# Patient Record
Sex: FEMALE | Race: WHITE | NOT HISPANIC OR LATINO | URBAN - METROPOLITAN AREA
[De-identification: names, ages, dates, MRNs, and addresses within clinical notes are randomized per-mention and may not be internally consistent; named-entity substitution may affect disease eponyms.]

---

## 2018-05-15 ENCOUNTER — EMERGENCY (EMERGENCY)
Facility: HOSPITAL | Age: 83
LOS: 1 days | Discharge: HOME | End: 2018-05-15
Attending: EMERGENCY MEDICINE | Admitting: EMERGENCY MEDICINE
Payer: COMMERCIAL

## 2018-05-15 VITALS
DIASTOLIC BLOOD PRESSURE: 76 MMHG | SYSTOLIC BLOOD PRESSURE: 174 MMHG | OXYGEN SATURATION: 100 % | HEART RATE: 68 BPM | RESPIRATION RATE: 18 BRPM

## 2018-05-15 PROCEDURE — 99283 EMERGENCY DEPT VISIT LOW MDM: CPT

## 2018-05-15 NOTE — ED PROVIDER NOTE - PLAN OF CARE
1. Rest, ice, elevate area.  Take acetaminophen (Tylenol) 650mg (2 regular strength tablets) as often as every 6 hours as needed for pain. Never take more than 4000mg of acetaminophen/Tylenol in any 24 hour period.   2. Follow up with PMD within 48-72 hrs.    3. Any worsening pain, swelling, weakness, numbness return to ED.

## 2018-05-15 NOTE — ED PROVIDER NOTE - UPPER EXTREMITY EXAM, RIGHT
normal/No obvious deformity, or swelling of right wrist/dorsum/palmar aspect,  radial and ulnar pulses intact, no TTP to wrist MCP/PIP/DIP joint, no snuffbox tenderness, sensation intact bilaterally, ROM of wrist intact, finger flexion and extension WNL

## 2018-05-15 NOTE — ED PROVIDER NOTE - ATTENDING CONTRIBUTION TO CARE
84 y/o female with the above documented history and HPI who on exam  appears well and comfortable. VSs noted, head NC/AT, EOMsI, neck supple c/ FROM s/ pain, paresthesia or midline c-spine ttp, lungs CTA, chest wall s/ ecchymosis, flail or ttp, cardiac sounds s/ audible m/r/g, abdomen soft, NT/ND s/ ecchymosis, back s/ ecchymosis or midline ttp, extremities s/ asymmetry c/ FROM X 4 s/ hesitation or discomfort including her R shoulder, elbow, wrist and digits, skin s/ ecchymosis c/ more of an abrasion than laceration to her R forearm s/ bony point ttp and neurologically completely intact. There is nothing clinically evident to suggest any acute or emergent process or significant injury. There is no clinical indication for any emergent diagnostic investigation. Wound managed and the patient has been discharged with appropriate instruction and follow-up.

## 2018-05-15 NOTE — ED PROVIDER NOTE - EXTREMITY EXAM
no deformity, pain or tenderness, no restriction of movement no deformity, pain or tenderness, no restriction of movement/right upper extremity findings
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
## 2020-05-26 NOTE — ED PROVIDER NOTE - LOCATION
-Patient reports decreasing smoking to 1 1/2 pack a day instead of 4 packs a day. arm/hand/finger/wrist

## 2020-10-30 ENCOUNTER — NEW REFERRAL (OUTPATIENT)
Dept: URBAN - METROPOLITAN AREA CLINIC 14 | Facility: CLINIC | Age: 85
End: 2020-10-30

## 2020-10-30 VITALS — HEIGHT: 55 IN

## 2020-10-30 DIAGNOSIS — H35.033: ICD-10-CM

## 2020-10-30 DIAGNOSIS — H34.8310: ICD-10-CM

## 2020-10-30 PROCEDURE — PFS EYLEA PFS

## 2020-10-30 PROCEDURE — 67028 INJECTION EYE DRUG: CPT

## 2020-10-30 PROCEDURE — 99204 OFFICE O/P NEW MOD 45 MIN: CPT | Mod: 25

## 2020-10-30 PROCEDURE — 92202 OPSCPY EXTND ON/MAC DRAW: CPT

## 2020-10-30 PROCEDURE — 92134 CPTRZ OPH DX IMG PST SGM RTA: CPT

## 2020-10-30 ASSESSMENT — VISUAL ACUITY
OD_PH: 20/80-1
OS_SC: 20/50-2
OD_SC: 20/100+1
OS_PH: 20/25+1

## 2020-10-30 ASSESSMENT — TONOMETRY
OS_IOP_MMHG: 12
OD_IOP_MMHG: 12

## 2020-12-18 ENCOUNTER — FOLLOW UP (OUTPATIENT)
Dept: URBAN - METROPOLITAN AREA CLINIC 14 | Facility: CLINIC | Age: 85
End: 2020-12-18

## 2020-12-18 VITALS — HEIGHT: 55 IN

## 2020-12-18 DIAGNOSIS — H35.033: ICD-10-CM

## 2020-12-18 DIAGNOSIS — H34.8310: ICD-10-CM

## 2020-12-18 PROCEDURE — 67028 INJECTION EYE DRUG: CPT

## 2020-12-18 PROCEDURE — PFS EYLEA PFS

## 2020-12-18 PROCEDURE — 92134 CPTRZ OPH DX IMG PST SGM RTA: CPT

## 2020-12-18 PROCEDURE — 92202 OPSCPY EXTND ON/MAC DRAW: CPT

## 2020-12-18 PROCEDURE — 92014 COMPRE OPH EXAM EST PT 1/>: CPT | Mod: 25

## 2020-12-18 ASSESSMENT — TONOMETRY
OS_IOP_MMHG: 11
OD_IOP_MMHG: 12

## 2020-12-18 ASSESSMENT — VISUAL ACUITY
OD_SC: 20/80-2
OS_SC: 20/25-2

## 2021-01-28 ENCOUNTER — FOLLOW UP (OUTPATIENT)
Dept: URBAN - METROPOLITAN AREA CLINIC 14 | Facility: CLINIC | Age: 86
End: 2021-01-28

## 2021-01-28 VITALS — HEIGHT: 55 IN

## 2021-01-28 DIAGNOSIS — Z96.1: ICD-10-CM

## 2021-01-28 DIAGNOSIS — H35.033: ICD-10-CM

## 2021-01-28 DIAGNOSIS — H34.8310: ICD-10-CM

## 2021-01-28 PROCEDURE — 92134 CPTRZ OPH DX IMG PST SGM RTA: CPT

## 2021-01-28 PROCEDURE — 92202 OPSCPY EXTND ON/MAC DRAW: CPT

## 2021-01-28 PROCEDURE — 67028 INJECTION EYE DRUG: CPT

## 2021-01-28 PROCEDURE — 92014 COMPRE OPH EXAM EST PT 1/>: CPT | Mod: 25

## 2021-01-28 PROCEDURE — PFS EYLEA PFS

## 2021-01-28 ASSESSMENT — VISUAL ACUITY
OD_CC: 20/80-1
OD_PH: 20/60-2
OS_PH: 20/25
OS_CC: 20/25

## 2021-01-28 ASSESSMENT — TONOMETRY
OS_IOP_MMHG: 12
OD_IOP_MMHG: 10

## 2021-03-15 ENCOUNTER — FOLLOW UP (OUTPATIENT)
Dept: URBAN - METROPOLITAN AREA CLINIC 14 | Facility: CLINIC | Age: 86
End: 2021-03-15

## 2021-03-15 DIAGNOSIS — H34.8310: ICD-10-CM

## 2021-03-15 DIAGNOSIS — H35.033: ICD-10-CM

## 2021-03-15 PROCEDURE — 92202 OPSCPY EXTND ON/MAC DRAW: CPT

## 2021-03-15 PROCEDURE — PFS EYLEA PFS

## 2021-03-15 PROCEDURE — 92134 CPTRZ OPH DX IMG PST SGM RTA: CPT

## 2021-03-15 PROCEDURE — 67028 INJECTION EYE DRUG: CPT

## 2021-03-15 PROCEDURE — 92014 COMPRE OPH EXAM EST PT 1/>: CPT | Mod: 25

## 2021-03-15 ASSESSMENT — VISUAL ACUITY
OD_PH: 20/80-1
OS_SC: 20/50-1
OD_SC: 20/100-1
OS_PH: 20/30

## 2021-03-15 ASSESSMENT — TONOMETRY
OS_IOP_MMHG: 14
OD_IOP_MMHG: 15

## 2021-05-03 ENCOUNTER — FOLLOW UP (OUTPATIENT)
Dept: URBAN - METROPOLITAN AREA CLINIC 14 | Facility: CLINIC | Age: 86
End: 2021-05-03

## 2021-05-03 VITALS — HEIGHT: 55 IN

## 2021-05-03 DIAGNOSIS — H34.8310: ICD-10-CM

## 2021-05-03 DIAGNOSIS — H35.033: ICD-10-CM

## 2021-05-03 PROCEDURE — 92134 CPTRZ OPH DX IMG PST SGM RTA: CPT

## 2021-05-03 PROCEDURE — PFS EYLEA PFS

## 2021-05-03 PROCEDURE — 67028 INJECTION EYE DRUG: CPT

## 2021-05-03 PROCEDURE — 92014 COMPRE OPH EXAM EST PT 1/>: CPT | Mod: 25

## 2021-05-03 PROCEDURE — 92202 OPSCPY EXTND ON/MAC DRAW: CPT

## 2021-05-03 ASSESSMENT — VISUAL ACUITY
OS_CC: 20/20
OD_CC: 20/80-1

## 2021-05-03 ASSESSMENT — TONOMETRY
OD_IOP_MMHG: 13
OS_IOP_MMHG: 15

## 2021-06-21 ENCOUNTER — FOLLOW UP (OUTPATIENT)
Dept: URBAN - METROPOLITAN AREA CLINIC 14 | Facility: CLINIC | Age: 86
End: 2021-06-21

## 2021-06-21 DIAGNOSIS — H34.8310: ICD-10-CM

## 2021-06-21 DIAGNOSIS — H35.033: ICD-10-CM

## 2021-06-21 PROCEDURE — 92134 CPTRZ OPH DX IMG PST SGM RTA: CPT

## 2021-06-21 PROCEDURE — PFS EYLEA PFS

## 2021-06-21 PROCEDURE — 92202 OPSCPY EXTND ON/MAC DRAW: CPT

## 2021-06-21 PROCEDURE — 92014 COMPRE OPH EXAM EST PT 1/>: CPT | Mod: 25

## 2021-06-21 PROCEDURE — 67028 INJECTION EYE DRUG: CPT

## 2021-06-21 ASSESSMENT — VISUAL ACUITY
OD_PH: 20/60-1
OD_CC: 20/80-2
OS_CC: 20/25+2

## 2021-06-21 ASSESSMENT — TONOMETRY
OS_IOP_MMHG: 11
OD_IOP_MMHG: 13

## 2021-08-16 ENCOUNTER — FOLLOW UP (OUTPATIENT)
Dept: URBAN - METROPOLITAN AREA CLINIC 14 | Facility: CLINIC | Age: 86
End: 2021-08-16

## 2021-08-16 DIAGNOSIS — H35.033: ICD-10-CM

## 2021-08-16 DIAGNOSIS — H34.8310: ICD-10-CM

## 2021-08-16 PROCEDURE — 92202 OPSCPY EXTND ON/MAC DRAW: CPT

## 2021-08-16 PROCEDURE — 92014 COMPRE OPH EXAM EST PT 1/>: CPT | Mod: 25

## 2021-08-16 PROCEDURE — 67028 INJECTION EYE DRUG: CPT

## 2021-08-16 PROCEDURE — PFS EYLEA PFS

## 2021-08-16 PROCEDURE — 92134 CPTRZ OPH DX IMG PST SGM RTA: CPT

## 2021-08-16 ASSESSMENT — VISUAL ACUITY
OD_SC: 20/80
OS_SC: 20/30-1

## 2021-08-16 ASSESSMENT — TONOMETRY
OS_IOP_MMHG: 11
OD_IOP_MMHG: 12

## 2021-10-12 ENCOUNTER — FOLLOW UP (OUTPATIENT)
Dept: URBAN - METROPOLITAN AREA CLINIC 14 | Facility: CLINIC | Age: 86
End: 2021-10-12

## 2021-10-12 DIAGNOSIS — H34.8310: ICD-10-CM

## 2021-10-12 DIAGNOSIS — H35.033: ICD-10-CM

## 2021-10-12 PROCEDURE — PFS EYLEA PFS

## 2021-10-12 PROCEDURE — 67028 INJECTION EYE DRUG: CPT

## 2021-10-12 PROCEDURE — 92014 COMPRE OPH EXAM EST PT 1/>: CPT | Mod: 25

## 2021-10-12 PROCEDURE — 92202 OPSCPY EXTND ON/MAC DRAW: CPT

## 2021-10-12 PROCEDURE — 92134 CPTRZ OPH DX IMG PST SGM RTA: CPT

## 2021-10-12 ASSESSMENT — VISUAL ACUITY
OD_CC: 20/50-3
OS_CC: 20/25

## 2021-10-12 ASSESSMENT — TONOMETRY
OS_IOP_MMHG: 11
OD_IOP_MMHG: 9

## 2021-12-03 ENCOUNTER — FOLLOW UP (OUTPATIENT)
Dept: URBAN - METROPOLITAN AREA CLINIC 14 | Facility: CLINIC | Age: 86
End: 2021-12-03

## 2021-12-03 DIAGNOSIS — H35.033: ICD-10-CM

## 2021-12-03 DIAGNOSIS — H34.8310: ICD-10-CM

## 2021-12-03 PROCEDURE — 67028 INJECTION EYE DRUG: CPT

## 2021-12-03 PROCEDURE — PFS EYLEA PFS

## 2021-12-03 PROCEDURE — 92202 OPSCPY EXTND ON/MAC DRAW: CPT

## 2021-12-03 PROCEDURE — 92014 COMPRE OPH EXAM EST PT 1/>: CPT | Mod: 25

## 2021-12-03 PROCEDURE — 92134 CPTRZ OPH DX IMG PST SGM RTA: CPT

## 2021-12-03 ASSESSMENT — TONOMETRY
OS_IOP_MMHG: 6
OS_IOP_MMHG: 7
OD_IOP_MMHG: 10

## 2021-12-03 ASSESSMENT — VISUAL ACUITY
OS_CC: 20/20-1
OD_CC: 20/80

## 2022-01-20 ENCOUNTER — FOLLOW UP (OUTPATIENT)
Dept: URBAN - METROPOLITAN AREA CLINIC 14 | Facility: CLINIC | Age: 87
End: 2022-01-20

## 2022-01-20 DIAGNOSIS — H35.033: ICD-10-CM

## 2022-01-20 DIAGNOSIS — H34.8310: ICD-10-CM

## 2022-01-20 PROCEDURE — PFS EYLEA PFS

## 2022-01-20 PROCEDURE — 67028 INJECTION EYE DRUG: CPT

## 2022-01-20 PROCEDURE — 92202 OPSCPY EXTND ON/MAC DRAW: CPT

## 2022-01-20 PROCEDURE — 92134 CPTRZ OPH DX IMG PST SGM RTA: CPT

## 2022-01-20 PROCEDURE — 92014 COMPRE OPH EXAM EST PT 1/>: CPT | Mod: 25

## 2022-01-20 ASSESSMENT — TONOMETRY
OD_IOP_MMHG: 34
OS_IOP_MMHG: 12

## 2022-01-20 ASSESSMENT — VISUAL ACUITY
OS_CC: 20/25-2
OD_CC: 20/100+1

## 2022-03-15 ENCOUNTER — FOLLOW UP (OUTPATIENT)
Dept: URBAN - METROPOLITAN AREA CLINIC 14 | Facility: CLINIC | Age: 87
End: 2022-03-15

## 2022-03-15 DIAGNOSIS — H34.8310: ICD-10-CM

## 2022-03-15 DIAGNOSIS — H35.033: ICD-10-CM

## 2022-03-15 PROCEDURE — 92134 CPTRZ OPH DX IMG PST SGM RTA: CPT

## 2022-03-15 PROCEDURE — 92014 COMPRE OPH EXAM EST PT 1/>: CPT | Mod: 25

## 2022-03-15 PROCEDURE — 92202 OPSCPY EXTND ON/MAC DRAW: CPT

## 2022-03-15 PROCEDURE — PFS EYLEA PFS

## 2022-03-15 PROCEDURE — 67028 INJECTION EYE DRUG: CPT

## 2022-03-15 ASSESSMENT — TONOMETRY: OD_IOP_MMHG: 11

## 2022-03-15 ASSESSMENT — VISUAL ACUITY
OD_CC: 20/80+2
OS_CC: 20/25

## 2022-05-09 ENCOUNTER — FOLLOW UP (OUTPATIENT)
Dept: URBAN - METROPOLITAN AREA CLINIC 14 | Facility: CLINIC | Age: 87
End: 2022-05-09

## 2022-05-09 DIAGNOSIS — H35.033: ICD-10-CM

## 2022-05-09 DIAGNOSIS — H34.8310: ICD-10-CM

## 2022-05-09 PROCEDURE — 92202 OPSCPY EXTND ON/MAC DRAW: CPT

## 2022-05-09 PROCEDURE — 92134 CPTRZ OPH DX IMG PST SGM RTA: CPT

## 2022-05-09 PROCEDURE — PFS EYLEA PFS

## 2022-05-09 PROCEDURE — 67028 INJECTION EYE DRUG: CPT

## 2022-05-09 PROCEDURE — 92014 COMPRE OPH EXAM EST PT 1/>: CPT | Mod: 25

## 2022-05-09 ASSESSMENT — TONOMETRY
OS_IOP_MMHG: 11
OD_IOP_MMHG: 9

## 2022-05-09 ASSESSMENT — VISUAL ACUITY
OS_CC: 20/40
OD_CC: 20/80

## 2022-07-07 ENCOUNTER — FOLLOW UP (OUTPATIENT)
Dept: URBAN - METROPOLITAN AREA CLINIC 14 | Facility: CLINIC | Age: 87
End: 2022-07-07

## 2022-07-07 DIAGNOSIS — H35.033: ICD-10-CM

## 2022-07-07 DIAGNOSIS — H34.8310: ICD-10-CM

## 2022-07-07 PROCEDURE — 92202 OPSCPY EXTND ON/MAC DRAW: CPT | Mod: NC

## 2022-07-07 PROCEDURE — 67028 INJECTION EYE DRUG: CPT

## 2022-07-07 PROCEDURE — 92134 CPTRZ OPH DX IMG PST SGM RTA: CPT

## 2022-07-07 PROCEDURE — PFS EYLEA PFS

## 2022-07-07 PROCEDURE — 92014 COMPRE OPH EXAM EST PT 1/>: CPT | Mod: 25

## 2022-07-07 ASSESSMENT — TONOMETRY
OD_IOP_MMHG: 10
OS_IOP_MMHG: 12

## 2022-07-07 ASSESSMENT — VISUAL ACUITY
OD_CC: 20/80-2
OS_CC: 20/25-2

## 2022-09-06 ENCOUNTER — FOLLOW UP (OUTPATIENT)
Dept: URBAN - METROPOLITAN AREA CLINIC 14 | Facility: CLINIC | Age: 87
End: 2022-09-06

## 2022-09-06 DIAGNOSIS — H34.8310: ICD-10-CM

## 2022-09-06 DIAGNOSIS — H35.033: ICD-10-CM

## 2022-09-06 PROCEDURE — PFS EYLEA PFS

## 2022-09-06 PROCEDURE — 92202 OPSCPY EXTND ON/MAC DRAW: CPT

## 2022-09-06 PROCEDURE — 67028 INJECTION EYE DRUG: CPT

## 2022-09-06 PROCEDURE — 92134 CPTRZ OPH DX IMG PST SGM RTA: CPT

## 2022-09-06 PROCEDURE — 92014 COMPRE OPH EXAM EST PT 1/>: CPT | Mod: 25

## 2022-09-06 ASSESSMENT — VISUAL ACUITY
OD_CC: 20/100+1
OS_CC: 20/30

## 2022-09-06 ASSESSMENT — TONOMETRY
OS_IOP_MMHG: 10
OD_IOP_MMHG: 11

## 2022-11-22 ENCOUNTER — FOLLOW UP (OUTPATIENT)
Dept: URBAN - METROPOLITAN AREA CLINIC 14 | Facility: CLINIC | Age: 87
End: 2022-11-22

## 2022-11-22 DIAGNOSIS — H34.8310: ICD-10-CM

## 2022-11-22 DIAGNOSIS — H35.033: ICD-10-CM

## 2022-11-22 PROCEDURE — PFS EYLEA PFS

## 2022-11-22 PROCEDURE — 92014 COMPRE OPH EXAM EST PT 1/>: CPT | Mod: 25

## 2022-11-22 PROCEDURE — 92202 OPSCPY EXTND ON/MAC DRAW: CPT | Mod: 59

## 2022-11-22 PROCEDURE — 67028 INJECTION EYE DRUG: CPT

## 2022-11-22 PROCEDURE — 92134 CPTRZ OPH DX IMG PST SGM RTA: CPT

## 2022-11-22 ASSESSMENT — VISUAL ACUITY
OS_CC: 20/25-2
OD_CC: 20/100+1

## 2022-11-22 ASSESSMENT — TONOMETRY
OS_IOP_MMHG: 12
OD_IOP_MMHG: 13

## 2023-01-24 ENCOUNTER — FOLLOW UP (OUTPATIENT)
Dept: URBAN - METROPOLITAN AREA CLINIC 14 | Facility: CLINIC | Age: 88
End: 2023-01-24

## 2023-01-24 DIAGNOSIS — H34.8310: ICD-10-CM

## 2023-01-24 DIAGNOSIS — H35.033: ICD-10-CM

## 2023-01-24 PROCEDURE — 92202 OPSCPY EXTND ON/MAC DRAW: CPT

## 2023-01-24 PROCEDURE — PFS EYLEA PFS

## 2023-01-24 PROCEDURE — 67028 INJECTION EYE DRUG: CPT

## 2023-01-24 PROCEDURE — 92014 COMPRE OPH EXAM EST PT 1/>: CPT | Mod: 25

## 2023-01-24 ASSESSMENT — VISUAL ACUITY
OD_CC: 20/100-1
OS_CC: 20/30-2

## 2023-04-04 ENCOUNTER — FOLLOW UP (OUTPATIENT)
Dept: URBAN - METROPOLITAN AREA CLINIC 14 | Facility: CLINIC | Age: 88
End: 2023-04-04

## 2023-04-04 DIAGNOSIS — H34.8310: ICD-10-CM

## 2023-04-04 DIAGNOSIS — H35.033: ICD-10-CM

## 2023-04-04 PROCEDURE — PFS EYLEA PFS

## 2023-04-04 PROCEDURE — 67028 INJECTION EYE DRUG: CPT

## 2023-04-04 PROCEDURE — 92134 CPTRZ OPH DX IMG PST SGM RTA: CPT

## 2023-04-04 PROCEDURE — 92014 COMPRE OPH EXAM EST PT 1/>: CPT | Mod: 25

## 2023-04-04 PROCEDURE — 92202 OPSCPY EXTND ON/MAC DRAW: CPT | Mod: 59

## 2023-07-13 ENCOUNTER — FOLLOW UP (OUTPATIENT)
Dept: URBAN - METROPOLITAN AREA CLINIC 14 | Facility: CLINIC | Age: 88
End: 2023-07-13

## 2023-07-13 DIAGNOSIS — H34.8310: ICD-10-CM

## 2023-07-13 PROCEDURE — PFS EYLEA PFS: Mod: JZ

## 2023-07-13 PROCEDURE — 67028 INJECTION EYE DRUG: CPT

## 2023-07-13 ASSESSMENT — TONOMETRY
OD_IOP_MMHG: 14
OS_IOP_MMHG: 10